# Patient Record
Sex: FEMALE | Race: ASIAN | NOT HISPANIC OR LATINO | URBAN - METROPOLITAN AREA
[De-identification: names, ages, dates, MRNs, and addresses within clinical notes are randomized per-mention and may not be internally consistent; named-entity substitution may affect disease eponyms.]

---

## 2021-02-14 ENCOUNTER — INPATIENT (INPATIENT)
Facility: HOSPITAL | Age: 36
LOS: 4 days | Discharge: HOME | End: 2021-02-19
Attending: SPECIALIST | Admitting: SPECIALIST
Payer: COMMERCIAL

## 2021-02-14 DIAGNOSIS — O26.899 OTHER SPECIFIED PREGNANCY RELATED CONDITIONS, UNSPECIFIED TRIMESTER: ICD-10-CM

## 2021-02-14 DIAGNOSIS — Z3A.00 WEEKS OF GESTATION OF PREGNANCY NOT SPECIFIED: ICD-10-CM

## 2021-02-14 LAB
APTT BLD: 28.1 SEC — SIGNIFICANT CHANGE UP (ref 27.5–35.5)
FIBRINOGEN PPP-MCNC: 370 MG/DL — SIGNIFICANT CHANGE UP (ref 258–438)
HCT VFR BLD CALC: 42.5 % — SIGNIFICANT CHANGE UP (ref 34.5–45)
HGB BLD-MCNC: 13.9 G/DL — SIGNIFICANT CHANGE UP (ref 11.5–15.5)
INR BLD: 0.98 — SIGNIFICANT CHANGE UP (ref 0.88–1.16)
MCHC RBC-ENTMCNC: 30 PG — SIGNIFICANT CHANGE UP (ref 27–34)
MCHC RBC-ENTMCNC: 32.7 GM/DL — SIGNIFICANT CHANGE UP (ref 32–36)
MCV RBC AUTO: 91.8 FL — SIGNIFICANT CHANGE UP (ref 80–100)
NRBC # BLD: 0 /100 WBCS — SIGNIFICANT CHANGE UP (ref 0–0)
PLATELET # BLD AUTO: 215 K/UL — SIGNIFICANT CHANGE UP (ref 150–400)
PROTHROM AB SERPL-ACNC: 11.8 SEC — SIGNIFICANT CHANGE UP (ref 10.6–13.6)
RBC # BLD: 4.63 M/UL — SIGNIFICANT CHANGE UP (ref 3.8–5.2)
RBC # FLD: 14 % — SIGNIFICANT CHANGE UP (ref 10.3–14.5)
WBC # BLD: 7.91 K/UL — SIGNIFICANT CHANGE UP (ref 3.8–10.5)
WBC # FLD AUTO: 7.91 K/UL — SIGNIFICANT CHANGE UP (ref 3.8–10.5)

## 2021-02-14 RX ORDER — OXYMETAZOLINE HYDROCHLORIDE 0.5 MG/ML
5 SPRAY NASAL ONCE
Refills: 0 | Status: COMPLETED | OUTPATIENT
Start: 2021-02-14 | End: 2021-02-14

## 2021-02-15 VITALS — HEIGHT: 63 IN | WEIGHT: 169.76 LBS

## 2021-02-15 LAB
ALBUMIN SERPL ELPH-MCNC: 3.4 G/DL — SIGNIFICANT CHANGE UP (ref 3.3–5)
ALP SERPL-CCNC: 116 U/L — SIGNIFICANT CHANGE UP (ref 40–120)
ALT FLD-CCNC: 10 U/L — SIGNIFICANT CHANGE UP (ref 10–45)
ANION GAP SERPL CALC-SCNC: 11 MMOL/L — SIGNIFICANT CHANGE UP (ref 5–17)
ANISOCYTOSIS BLD QL: SLIGHT — SIGNIFICANT CHANGE UP
APTT BLD: 28.6 SEC — SIGNIFICANT CHANGE UP (ref 27.5–35.5)
AST SERPL-CCNC: 20 U/L — SIGNIFICANT CHANGE UP (ref 10–40)
BASOPHILS # BLD AUTO: 0 K/UL — SIGNIFICANT CHANGE UP (ref 0–0.2)
BASOPHILS NFR BLD AUTO: 0 % — SIGNIFICANT CHANGE UP (ref 0–2)
BILIRUB SERPL-MCNC: 0.5 MG/DL — SIGNIFICANT CHANGE UP (ref 0.2–1.2)
BLD GP AB SCN SERPL QL: NEGATIVE — SIGNIFICANT CHANGE UP
BUN SERPL-MCNC: 17 MG/DL — SIGNIFICANT CHANGE UP (ref 7–23)
CALCIUM SERPL-MCNC: 8.7 MG/DL — SIGNIFICANT CHANGE UP (ref 8.4–10.5)
CHLORIDE SERPL-SCNC: 107 MMOL/L — SIGNIFICANT CHANGE UP (ref 96–108)
CO2 SERPL-SCNC: 22 MMOL/L — SIGNIFICANT CHANGE UP (ref 22–31)
CREAT SERPL-MCNC: 0.54 MG/DL — SIGNIFICANT CHANGE UP (ref 0.5–1.3)
EOSINOPHIL # BLD AUTO: 0 K/UL — SIGNIFICANT CHANGE UP (ref 0–0.5)
EOSINOPHIL NFR BLD AUTO: 0 % — SIGNIFICANT CHANGE UP (ref 0–6)
FIBRINOGEN PPP-MCNC: 335 MG/DL — SIGNIFICANT CHANGE UP (ref 258–438)
GLUCOSE SERPL-MCNC: 79 MG/DL — SIGNIFICANT CHANGE UP (ref 70–99)
HCT VFR BLD CALC: 37.5 % — SIGNIFICANT CHANGE UP (ref 34.5–45)
HGB BLD-MCNC: 12.4 G/DL — SIGNIFICANT CHANGE UP (ref 11.5–15.5)
INR BLD: 0.98 — SIGNIFICANT CHANGE UP (ref 0.88–1.16)
LYMPHOCYTES # BLD AUTO: 0.93 K/UL — LOW (ref 1–3.3)
LYMPHOCYTES # BLD AUTO: 9.6 % — LOW (ref 13–44)
MACROCYTES BLD QL: SLIGHT — SIGNIFICANT CHANGE UP
MANUAL SMEAR VERIFICATION: SIGNIFICANT CHANGE UP
MCHC RBC-ENTMCNC: 30.9 PG — SIGNIFICANT CHANGE UP (ref 27–34)
MCHC RBC-ENTMCNC: 33.1 GM/DL — SIGNIFICANT CHANGE UP (ref 32–36)
MCV RBC AUTO: 93.5 FL — SIGNIFICANT CHANGE UP (ref 80–100)
METAMYELOCYTES # FLD: 0.9 % — HIGH (ref 0–0)
MICROCYTES BLD QL: SLIGHT — SIGNIFICANT CHANGE UP
MONOCYTES # BLD AUTO: 0.93 K/UL — HIGH (ref 0–0.9)
MONOCYTES NFR BLD AUTO: 9.6 % — SIGNIFICANT CHANGE UP (ref 2–14)
NEUTROPHILS # BLD AUTO: 7.66 K/UL — HIGH (ref 1.8–7.4)
NEUTROPHILS NFR BLD AUTO: 77.2 % — HIGH (ref 43–77)
NEUTS BAND # BLD: 1.8 % — SIGNIFICANT CHANGE UP (ref 0–8)
NRBC # BLD: 1 /100 — HIGH (ref 0–0)
NRBC # BLD: SIGNIFICANT CHANGE UP /100 WBCS (ref 0–0)
PLAT MORPH BLD: NORMAL — SIGNIFICANT CHANGE UP
PLATELET # BLD AUTO: 208 K/UL — SIGNIFICANT CHANGE UP (ref 150–400)
POLYCHROMASIA BLD QL SMEAR: SLIGHT — SIGNIFICANT CHANGE UP
POTASSIUM SERPL-MCNC: 4 MMOL/L — SIGNIFICANT CHANGE UP (ref 3.5–5.3)
POTASSIUM SERPL-SCNC: 4 MMOL/L — SIGNIFICANT CHANGE UP (ref 3.5–5.3)
PROT SERPL-MCNC: 6.1 G/DL — SIGNIFICANT CHANGE UP (ref 6–8.3)
PROTHROM AB SERPL-ACNC: 11.8 SEC — SIGNIFICANT CHANGE UP (ref 10.6–13.6)
RBC # BLD: 4.01 M/UL — SIGNIFICANT CHANGE UP (ref 3.8–5.2)
RBC # FLD: 13.9 % — SIGNIFICANT CHANGE UP (ref 10.3–14.5)
RBC BLD AUTO: ABNORMAL
RH IG SCN BLD-IMP: POSITIVE — SIGNIFICANT CHANGE UP
RH IG SCN BLD-IMP: POSITIVE — SIGNIFICANT CHANGE UP
SARS-COV-2 IGG SERPL QL IA: NEGATIVE — SIGNIFICANT CHANGE UP
SARS-COV-2 IGM SERPL IA-ACNC: 0.08 INDEX — SIGNIFICANT CHANGE UP
SARS-COV-2 RNA SPEC QL NAA+PROBE: SIGNIFICANT CHANGE UP
SODIUM SERPL-SCNC: 140 MMOL/L — SIGNIFICANT CHANGE UP (ref 135–145)
SPHEROCYTES BLD QL SMEAR: SLIGHT — SIGNIFICANT CHANGE UP
T PALLIDUM AB TITR SER: NEGATIVE — SIGNIFICANT CHANGE UP
VARIANT LYMPHS # BLD: 0.9 % — SIGNIFICANT CHANGE UP (ref 0–6)
WBC # BLD: 9.69 K/UL — SIGNIFICANT CHANGE UP (ref 3.8–10.5)
WBC # FLD AUTO: 9.69 K/UL — SIGNIFICANT CHANGE UP (ref 3.8–10.5)

## 2021-02-15 PROCEDURE — 88307 TISSUE EXAM BY PATHOLOGIST: CPT | Mod: 26

## 2021-02-15 RX ORDER — MAGNESIUM HYDROXIDE 400 MG/1
30 TABLET, CHEWABLE ORAL
Refills: 0 | Status: DISCONTINUED | OUTPATIENT
Start: 2021-02-15 | End: 2021-02-19

## 2021-02-15 RX ORDER — OXYCODONE HYDROCHLORIDE 5 MG/1
5 TABLET ORAL ONCE
Refills: 0 | Status: DISCONTINUED | OUTPATIENT
Start: 2021-02-15 | End: 2021-02-19

## 2021-02-15 RX ORDER — SODIUM CHLORIDE 9 MG/ML
1000 INJECTION, SOLUTION INTRAVENOUS
Refills: 0 | Status: DISCONTINUED | OUTPATIENT
Start: 2021-02-15 | End: 2021-02-19

## 2021-02-15 RX ORDER — KETOROLAC TROMETHAMINE 30 MG/ML
30 SYRINGE (ML) INJECTION EVERY 6 HOURS
Refills: 0 | Status: DISCONTINUED | OUTPATIENT
Start: 2021-02-15 | End: 2021-02-16

## 2021-02-15 RX ORDER — ACETAMINOPHEN 500 MG
975 TABLET ORAL EVERY 6 HOURS
Refills: 0 | Status: DISCONTINUED | OUTPATIENT
Start: 2021-02-15 | End: 2021-02-19

## 2021-02-15 RX ORDER — TETANUS TOXOID, REDUCED DIPHTHERIA TOXOID AND ACELLULAR PERTUSSIS VACCINE, ADSORBED 5; 2.5; 8; 8; 2.5 [IU]/.5ML; [IU]/.5ML; UG/.5ML; UG/.5ML; UG/.5ML
0.5 SUSPENSION INTRAMUSCULAR ONCE
Refills: 0 | Status: DISCONTINUED | OUTPATIENT
Start: 2021-02-15 | End: 2021-02-19

## 2021-02-15 RX ORDER — SIMETHICONE 80 MG/1
80 TABLET, CHEWABLE ORAL EVERY 4 HOURS
Refills: 0 | Status: DISCONTINUED | OUTPATIENT
Start: 2021-02-15 | End: 2021-02-19

## 2021-02-15 RX ORDER — ONDANSETRON 8 MG/1
4 TABLET, FILM COATED ORAL EVERY 6 HOURS
Refills: 0 | Status: DISCONTINUED | OUTPATIENT
Start: 2021-02-15 | End: 2021-02-15

## 2021-02-15 RX ORDER — FOLIC ACID 0.8 MG
1 TABLET ORAL DAILY
Refills: 0 | Status: DISCONTINUED | OUTPATIENT
Start: 2021-02-15 | End: 2021-02-19

## 2021-02-15 RX ORDER — LANOLIN
1 OINTMENT (GRAM) TOPICAL EVERY 6 HOURS
Refills: 0 | Status: DISCONTINUED | OUTPATIENT
Start: 2021-02-15 | End: 2021-02-19

## 2021-02-15 RX ORDER — NALOXONE HYDROCHLORIDE 4 MG/.1ML
0.1 SPRAY NASAL
Refills: 0 | Status: DISCONTINUED | OUTPATIENT
Start: 2021-02-15 | End: 2021-02-15

## 2021-02-15 RX ORDER — DIPHENHYDRAMINE HCL 50 MG
25 CAPSULE ORAL EVERY 6 HOURS
Refills: 0 | Status: DISCONTINUED | OUTPATIENT
Start: 2021-02-15 | End: 2021-02-19

## 2021-02-15 RX ORDER — IBUPROFEN 200 MG
600 TABLET ORAL EVERY 6 HOURS
Refills: 0 | Status: COMPLETED | OUTPATIENT
Start: 2021-02-15 | End: 2022-01-14

## 2021-02-15 RX ORDER — SODIUM CHLORIDE 9 MG/ML
1000 INJECTION, SOLUTION INTRAVENOUS
Refills: 0 | Status: DISCONTINUED | OUTPATIENT
Start: 2021-02-15 | End: 2021-02-15

## 2021-02-15 RX ORDER — DEXAMETHASONE 0.5 MG/5ML
4 ELIXIR ORAL EVERY 6 HOURS
Refills: 0 | Status: DISCONTINUED | OUTPATIENT
Start: 2021-02-15 | End: 2021-02-15

## 2021-02-15 RX ORDER — CITRIC ACID/SODIUM CITRATE 300-500 MG
30 SOLUTION, ORAL ORAL ONCE
Refills: 0 | Status: COMPLETED | OUTPATIENT
Start: 2021-02-15 | End: 2021-02-15

## 2021-02-15 RX ORDER — FAMOTIDINE 10 MG/ML
20 INJECTION INTRAVENOUS ONCE
Refills: 0 | Status: COMPLETED | OUTPATIENT
Start: 2021-02-15 | End: 2021-02-15

## 2021-02-15 RX ORDER — OXYTOCIN 10 UNIT/ML
333.33 VIAL (ML) INJECTION
Qty: 20 | Refills: 0 | Status: DISCONTINUED | OUTPATIENT
Start: 2021-02-15 | End: 2021-02-19

## 2021-02-15 RX ORDER — MORPHINE SULFATE 50 MG/1
0.2 CAPSULE, EXTENDED RELEASE ORAL ONCE
Refills: 0 | Status: DISCONTINUED | OUTPATIENT
Start: 2021-02-15 | End: 2021-02-15

## 2021-02-15 RX ORDER — METOCLOPRAMIDE HCL 10 MG
10 TABLET ORAL ONCE
Refills: 0 | Status: COMPLETED | OUTPATIENT
Start: 2021-02-15 | End: 2021-02-15

## 2021-02-15 RX ORDER — CEFAZOLIN SODIUM 1 G
2000 VIAL (EA) INJECTION ONCE
Refills: 0 | Status: COMPLETED | OUTPATIENT
Start: 2021-02-15 | End: 2021-02-15

## 2021-02-15 RX ORDER — OXYTOCIN 10 UNIT/ML
333.33 VIAL (ML) INJECTION
Qty: 20 | Refills: 0 | Status: DISCONTINUED | OUTPATIENT
Start: 2021-02-15 | End: 2021-02-15

## 2021-02-15 RX ORDER — SODIUM CHLORIDE 9 MG/ML
1000 INJECTION, SOLUTION INTRAVENOUS ONCE
Refills: 0 | Status: COMPLETED | OUTPATIENT
Start: 2021-02-15 | End: 2021-02-15

## 2021-02-15 RX ORDER — METOCLOPRAMIDE HCL 10 MG
10 TABLET ORAL ONCE
Refills: 0 | Status: DISCONTINUED | OUTPATIENT
Start: 2021-02-15 | End: 2021-02-15

## 2021-02-15 RX ORDER — FERROUS SULFATE 325(65) MG
325 TABLET ORAL DAILY
Refills: 0 | Status: DISCONTINUED | OUTPATIENT
Start: 2021-02-15 | End: 2021-02-19

## 2021-02-15 RX ORDER — OXYCODONE HYDROCHLORIDE 5 MG/1
5 TABLET ORAL
Refills: 0 | Status: COMPLETED | OUTPATIENT
Start: 2021-02-15 | End: 2021-02-22

## 2021-02-15 RX ADMIN — Medication 10 MILLIGRAM(S): at 18:03

## 2021-02-15 RX ADMIN — SODIUM CHLORIDE 2000 MILLILITER(S): 9 INJECTION, SOLUTION INTRAVENOUS at 09:09

## 2021-02-15 RX ADMIN — Medication 1 APPLICATION(S): at 00:45

## 2021-02-15 RX ADMIN — Medication 30 MILLILITER(S): at 09:09

## 2021-02-15 RX ADMIN — SODIUM CHLORIDE 1000 MILLILITER(S): 9 INJECTION, SOLUTION INTRAVENOUS at 01:00

## 2021-02-15 RX ADMIN — SODIUM CHLORIDE 125 MILLILITER(S): 9 INJECTION, SOLUTION INTRAVENOUS at 09:09

## 2021-02-15 RX ADMIN — OXYMETAZOLINE HYDROCHLORIDE 5 SPRAY(S): 0.5 SPRAY NASAL at 00:50

## 2021-02-15 RX ADMIN — Medication 30 MILLIGRAM(S): at 14:17

## 2021-02-15 RX ADMIN — Medication 100 MILLIGRAM(S): at 09:09

## 2021-02-15 RX ADMIN — Medication 30 MILLIGRAM(S): at 19:37

## 2021-02-15 RX ADMIN — FAMOTIDINE 20 MILLIGRAM(S): 10 INJECTION INTRAVENOUS at 09:09

## 2021-02-15 NOTE — CONSULT NOTE ADULT - SUBJECTIVE AND OBJECTIVE BOX
HPI: 35y Female with no PMH  at 33 weeks presenting with 1 day of epistaxis. Patient states that this morning she had an episode of bleeding and presented to the ED and was discharged after bleeding resolved. This evening bleeding has continued for approximately 2 hours of pressure without stopping. Patient seen at the bedside and actively bleeding anteriorly for the right naris and coughing up clots. Denies previous episodes of epistaxis, easy bruising, hemarthrosis. Endorses heavy periods. No known hematologic conditions in her family. Patient not on any anticoagulation or herbal supplements. Hgb 13, platelets 213, and coags wnl.     After cauterization of anterior septum with silver nitrate, patient started feeling lightheaded, systolic blood pressure in the 90s, baseline 150s. 1L bolus started. Tocometer showing decelerations and patient repositioned. Patient subsequently reported improvement of symptoms and decelerations resolved.    Allergies    No Known Allergies    Intolerances        All other standing medications:   oxymetazoline 0.05% Nasal Spray 5 Spray(s) Both Nostrils Once  silver nitrate Applicator 1 Application(s) Topical Once      LABS:  CBC-                        13.9   7.91  )-----------( 215      ( 2021 23:16 )             42.5               Coagulation Studies-  PT/INR - ( 2021 23:16 )   PT: 11.8 sec;   INR: 0.98          PTT - ( 2021 23:16 )  PTT:28.1 sec  Endocrine Panel-        PHYSICAL EXAM:    ENT EXAM-     General: sitting down upright, coughing up clots of blood, blood oozing from right naris, patient not acutely distressed and holding paper towel to nose with head flexed  Head: atraumatic and normocephalic  Oral: moist mucous membranes, tongue midline, posterior oropharynx clear of blood  Nose: right anterior septum with 2 areas of active oozing, cauterized with silver nitrate; bleeding resolved, fibrillar absorbable packing placed in right nasal cavity  Neck: soft, flat, full ROM  Resp: normal work of breathing      RADIOLOGY & ADDITIONAL STUDIES:      Assessment/Plan:  35y Female  at 33weeks, presenting with epistaxis, found to have oozing from anterior septum, cauterized with silver nitrate and absorbable fibrillar placed, hemostasis achieved.    -Absorbable packing will stay in place and no need for removal  -Afrin BID for 3 days the PRN if bleeding restarts  -Obstetrics care per primary team  -Please page ENT with questions or concerns      Thank you for the consult, please page ENT at [phone] with any questions/concerns.  -------------------------------------------------  Yuliana Elizabeth MD  Department of Otolaryngology - Head and Neck Surgery  [facility]

## 2021-02-15 NOTE — CONSULT NOTE ADULT - SUBJECTIVE AND OBJECTIVE BOX
HPI: 35y Female with no PMH  at 33 weeks returning with epistaxis, after discharge (please see previous consult note for initial presentation). After patient was discharged, epistaxis resumed in parking garage and patient returned to hospital. States she is bleeding bilaterally but amount is significantly decreased from prior encounter. Packing removed and blood suctioned. Nasal endoscopy performed showing bleeding in posterior nasopharynx without clear source. Vitals signs stable. Contractions detected. Fetal tocometer showing evidence of decelerations. Repeat labs within normal limits.    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 Plans to deliver at [facility].     Allergies    No Known Allergies    Intolerances  LABS:  CBC-                        12.4   9.69  )-----------( 208      ( 15 Feb 2021 07:00 )             37.5       Coagulation Studies-  PT/INR - ( 15 Feb 2021 07:00 )   PT: 11.8 sec;   INR: 0.98          PTT - ( 15 Feb 2021 07:00 )  PTT:28.6 sec  Endocrine Panel-        PHYSICAL EXAM:    General: sitting down upright, blood oozing from right naris with slight trickle on left naris, patient not acutely distressed and holding paper towel to nose with head flexed  Head: atraumatic and normocephalic  Oral: moist mucous membranes, tongue midline, posterior oropharynx with streak of blood  Nose: clear anteriorly  Neck: soft, flat, full ROM  Resp: normal work of breathing          Nasal Endoscopy Findings:  -nasal passages clear after blood suctioned, areas of streaked blood on posterior nasopharynx, no clear source visualized    Procedure:  afrin and lidocaine with epi soaked pledgets placed in bilateral nostrils for 5 minutes, nasal cavity suctioned and absorbable fibrillar and floseal placed in the right nasal cavity.       Assessment/Plan:  35y Female  at 33weeks, presenting with epistaxis, found to have oozing from anterior septum and posterior nasopharynx, likely AVM of pregnancy causing refractory epistaxis.     -Recommend prolonged monitoring or admission due to continued bleeding and obstetric concerns  -Patient with moustache gauze dressing; please monitor how often having to change, if >every 1 hour, please contact ENT   -Absorbable packing will stay in place and no need for removal, but ENT will re-assess for bleeding and consider absorbable 8cm merocels if epistaxis continues  -Afrin BID for 3 days the PRN if bleeding restarts  -Obstetrics care per primary team  -Please have patient follow up with Dr. Luz Thomas, call [phone] for appointment  -Please page ENT with questions or concerns        Thank you for the consult, please page ENT at [phone] with any questions/concerns.  -------------------------------------------------  Yuliana Elizabeth MD  Department of Otolaryngology - Head and Neck Surgery  [facility]

## 2021-02-15 NOTE — CONSULT NOTE ADULT - SUBJECTIVE AND OBJECTIVE BOX
See previous notes for full hx.    Now s/p c - section. Re evaluated. No longer bleeding from the nose. Packing in place.    PE:  4x4 gauze in place, no blood (has been there for several hours)  R side  anterior nasal packing in place, not saturated  L side without any bleeding, only dry crusting at the naris   Opx: no active area of bleeding    A/P: 34yo F 36 weeks pregnant p/w epistaxis overnight, now s/p c section delivery and packing of the nasal cavity.   - some mild bleeding is still expected 2/2 clot mobilization from sinuses and nasal cavity  - 1-2 gauzes of 4x4 mustache dressing is expected to be saturated for first 24 hours after packing   - no nose blowing, no straining with bowel movements and no heavy lifting for at least 2 weeks  - packing is absorbable and will slowly mobilize over the next 2 weeks (will look like thick mucous secretions)  - saline rinses to the nose can start bid in 3 days to humidify the nose   - if she oozes again, can try copious afrin spraying and holding the soft part of the nose for 10 minutes    d/w Dr. Thomas  See previous notes for full hx.    Now s/p c - section. Re evaluated. No longer bleeding from the nose. Packing in place.    PE:  4x4 gauze in place, no blood (has been there for several hours)  R side  anterior nasal packing in place, not saturated  L side without any bleeding, only dry crusting at the naris   Opx: no active area of bleeding    NB: Patient was COVID swabbed by me, transorally and retroflexed to the Npx to avoid further trauma to the nose.     A/P: 34yo F 36 weeks pregnant p/w epistaxis overnight, now s/p c section delivery and packing of the nasal cavity.   - some mild bleeding is still expected 2/2 clot mobilization from sinuses and nasal cavity  - 1-2 gauzes of 4x4 mustache dressing is expected to be saturated for first 24 hours after packing   - no nose blowing, no straining with bowel movements and no heavy lifting for at least 2 weeks  - packing is absorbable and will slowly mobilize over the next 2 weeks (will look like thick mucous secretions)  - saline rinses to the nose can start bid in 3 days (ie Thursday 2/18) to humidify the nose   - if she oozes again, can try copious afrin spraying and holding the soft part of the nose for 10 minutes  - can follow up with Dr. Thomas on discharge, 1-2 weeks following     d/w Dr. Thomas

## 2021-02-16 LAB
BASOPHILS # BLD AUTO: 0.03 K/UL — SIGNIFICANT CHANGE UP (ref 0–0.2)
BASOPHILS NFR BLD AUTO: 0.3 % — SIGNIFICANT CHANGE UP (ref 0–2)
EOSINOPHIL # BLD AUTO: 0.03 K/UL — SIGNIFICANT CHANGE UP (ref 0–0.5)
EOSINOPHIL NFR BLD AUTO: 0.3 % — SIGNIFICANT CHANGE UP (ref 0–6)
GRAM STN FLD: SIGNIFICANT CHANGE UP
GRAM STN FLD: SIGNIFICANT CHANGE UP
HCT VFR BLD CALC: 28.4 % — LOW (ref 34.5–45)
HGB BLD-MCNC: 9.1 G/DL — LOW (ref 11.5–15.5)
IMM GRANULOCYTES NFR BLD AUTO: 1.1 % — SIGNIFICANT CHANGE UP (ref 0–1.5)
LYMPHOCYTES # BLD AUTO: 1.42 K/UL — SIGNIFICANT CHANGE UP (ref 1–3.3)
LYMPHOCYTES # BLD AUTO: 12.5 % — LOW (ref 13–44)
MCHC RBC-ENTMCNC: 30.2 PG — SIGNIFICANT CHANGE UP (ref 27–34)
MCHC RBC-ENTMCNC: 32 GM/DL — SIGNIFICANT CHANGE UP (ref 32–36)
MCV RBC AUTO: 94.4 FL — SIGNIFICANT CHANGE UP (ref 80–100)
MONOCYTES # BLD AUTO: 1.01 K/UL — HIGH (ref 0–0.9)
MONOCYTES NFR BLD AUTO: 8.9 % — SIGNIFICANT CHANGE UP (ref 2–14)
NEUTROPHILS # BLD AUTO: 8.75 K/UL — HIGH (ref 1.8–7.4)
NEUTROPHILS NFR BLD AUTO: 76.9 % — SIGNIFICANT CHANGE UP (ref 43–77)
NRBC # BLD: 0 /100 WBCS — SIGNIFICANT CHANGE UP (ref 0–0)
PLATELET # BLD AUTO: 179 K/UL — SIGNIFICANT CHANGE UP (ref 150–400)
RBC # BLD: 3.01 M/UL — LOW (ref 3.8–5.2)
RBC # FLD: 14.3 % — SIGNIFICANT CHANGE UP (ref 10.3–14.5)
SPECIMEN SOURCE: SIGNIFICANT CHANGE UP
SPECIMEN SOURCE: SIGNIFICANT CHANGE UP
WBC # BLD: 11.37 K/UL — HIGH (ref 3.8–10.5)
WBC # FLD AUTO: 11.37 K/UL — HIGH (ref 3.8–10.5)

## 2021-02-16 RX ORDER — LANOLIN ALCOHOL/MO/W.PET/CERES
3 CREAM (GRAM) TOPICAL AT BEDTIME
Refills: 0 | Status: DISCONTINUED | OUTPATIENT
Start: 2021-02-16 | End: 2021-02-19

## 2021-02-16 RX ORDER — IBUPROFEN 200 MG
600 TABLET ORAL EVERY 6 HOURS
Refills: 0 | Status: DISCONTINUED | OUTPATIENT
Start: 2021-02-16 | End: 2021-02-19

## 2021-02-16 RX ADMIN — Medication 30 MILLIGRAM(S): at 00:32

## 2021-02-16 RX ADMIN — Medication 600 MILLIGRAM(S): at 11:05

## 2021-02-16 RX ADMIN — Medication 325 MILLIGRAM(S): at 11:05

## 2021-02-16 RX ADMIN — Medication 1 MILLIGRAM(S): at 11:05

## 2021-02-16 RX ADMIN — Medication 30 MILLIGRAM(S): at 06:00

## 2021-02-16 RX ADMIN — Medication 975 MILLIGRAM(S): at 22:38

## 2021-02-16 RX ADMIN — Medication 1 TABLET(S): at 11:05

## 2021-02-16 RX ADMIN — Medication 3 MILLIGRAM(S): at 22:39

## 2021-02-16 RX ADMIN — Medication 975 MILLIGRAM(S): at 08:16

## 2021-02-16 RX ADMIN — Medication 600 MILLIGRAM(S): at 17:16

## 2021-02-16 RX ADMIN — Medication 975 MILLIGRAM(S): at 14:36

## 2021-02-16 NOTE — LACTATION INITIAL EVALUATION - LACTATION INTERVENTIONS
Mom has a breast pump to use at home/initiate skin to skin/initiate hand expression routine/initiate dual electric pump routine/initiate/review early breastfeeding management guidelines/initiate/review supplementation plan due to medical indications/review techniques to increase milk supply

## 2021-02-17 LAB — SURGICAL PATHOLOGY STUDY: SIGNIFICANT CHANGE UP

## 2021-02-17 RX ORDER — OXYCODONE HYDROCHLORIDE 5 MG/1
5 TABLET ORAL ONCE
Refills: 0 | Status: DISCONTINUED | OUTPATIENT
Start: 2021-02-17 | End: 2021-02-17

## 2021-02-17 RX ORDER — OXYCODONE HYDROCHLORIDE 5 MG/1
5 TABLET ORAL
Refills: 0 | Status: DISCONTINUED | OUTPATIENT
Start: 2021-02-17 | End: 2021-02-19

## 2021-02-17 RX ADMIN — OXYCODONE HYDROCHLORIDE 5 MILLIGRAM(S): 5 TABLET ORAL at 09:08

## 2021-02-17 RX ADMIN — Medication 600 MILLIGRAM(S): at 12:27

## 2021-02-17 RX ADMIN — Medication 600 MILLIGRAM(S): at 18:42

## 2021-02-17 RX ADMIN — Medication 975 MILLIGRAM(S): at 07:41

## 2021-02-17 RX ADMIN — Medication 1 TABLET(S): at 12:27

## 2021-02-17 RX ADMIN — Medication 3 MILLIGRAM(S): at 22:54

## 2021-02-17 RX ADMIN — Medication 1 MILLIGRAM(S): at 12:27

## 2021-02-17 RX ADMIN — Medication 975 MILLIGRAM(S): at 15:36

## 2021-02-17 RX ADMIN — Medication 975 MILLIGRAM(S): at 21:01

## 2021-02-17 RX ADMIN — Medication 325 MILLIGRAM(S): at 12:27

## 2021-02-17 RX ADMIN — Medication 600 MILLIGRAM(S): at 05:55

## 2021-02-18 LAB
-  CEFAZOLIN: SIGNIFICANT CHANGE UP
-  CLINDAMYCIN: SIGNIFICANT CHANGE UP
-  ERYTHROMYCIN: SIGNIFICANT CHANGE UP
-  LINEZOLID: SIGNIFICANT CHANGE UP
-  OXACILLIN: SIGNIFICANT CHANGE UP
-  RIFAMPIN: SIGNIFICANT CHANGE UP
-  TRIMETHOPRIM/SULFAMETHOXAZOLE: SIGNIFICANT CHANGE UP
-  VANCOMYCIN: SIGNIFICANT CHANGE UP
METHOD TYPE: SIGNIFICANT CHANGE UP

## 2021-02-18 RX ORDER — ACETAMINOPHEN 500 MG
3 TABLET ORAL
Qty: 0 | Refills: 0 | DISCHARGE
Start: 2021-02-18

## 2021-02-18 RX ORDER — IBUPROFEN 200 MG
1 TABLET ORAL
Qty: 0 | Refills: 0 | DISCHARGE
Start: 2021-02-18

## 2021-02-18 RX ADMIN — Medication 325 MILLIGRAM(S): at 11:47

## 2021-02-18 RX ADMIN — Medication 975 MILLIGRAM(S): at 21:00

## 2021-02-18 RX ADMIN — Medication 975 MILLIGRAM(S): at 02:27

## 2021-02-18 RX ADMIN — Medication 600 MILLIGRAM(S): at 00:01

## 2021-02-18 RX ADMIN — Medication 600 MILLIGRAM(S): at 06:03

## 2021-02-18 RX ADMIN — Medication 975 MILLIGRAM(S): at 14:41

## 2021-02-18 RX ADMIN — OXYCODONE HYDROCHLORIDE 5 MILLIGRAM(S): 5 TABLET ORAL at 06:03

## 2021-02-18 RX ADMIN — Medication 1 TABLET(S): at 11:47

## 2021-02-18 RX ADMIN — OXYCODONE HYDROCHLORIDE 5 MILLIGRAM(S): 5 TABLET ORAL at 14:42

## 2021-02-18 RX ADMIN — Medication 600 MILLIGRAM(S): at 18:29

## 2021-02-18 RX ADMIN — Medication 600 MILLIGRAM(S): at 11:47

## 2021-02-18 RX ADMIN — Medication 1 MILLIGRAM(S): at 11:48

## 2021-02-18 NOTE — DISCHARGE NOTE OB - MEDICATION SUMMARY - MEDICATIONS TO TAKE
I will START or STAY ON the medications listed below when I get home from the hospital:    acetaminophen 325 mg oral tablet  -- 3 tab(s) by mouth every 6 hours  -- Indication: For Pain    ibuprofen 600 mg oral tablet  -- 1 tab(s) by mouth every 6 hours  -- Indication: For Pin    Prenatal Multivitamins with Folic Acid 1 mg oral tablet  -- 1 tab(s) by mouth once a day  -- Indication: For Postpartum

## 2021-02-18 NOTE — DISCHARGE NOTE OB - PATIENT PORTAL LINK FT
You can access the FollowMyHealth Patient Portal offered by [facility] by registering at the following website: [email or web address]. By joining [facility]’s FollowMyHealth portal, you will also be able to view your health information using other applications (apps) compatible with our system.

## 2021-02-18 NOTE — DISCHARGE NOTE OB - HOSPITAL COURSE
Patient had uncomplicated low transverse  section.  Please see operative note for details.  During postpartum course patient's vitals were stable, vaginal bleeding appropriate, and pain well controlled.  Post operation day one hematocrit was appropriate.  On day of discharge patient was ambulating, her pain controlled with oral medications, had adequate oral intake, and was voiding freely.  Discharge instructions and precautions were given.  Will return to clinic in 2 weeks for incision check.

## 2021-02-18 NOTE — DISCHARGE NOTE OB - CARE PROVIDER_API CALL
Flori Hebert)  Obstetrics and Gynecology  [address], [address] [place] [address]  Phone: [phone]  Fax: [phone]  Follow Up Time:

## 2021-02-19 VITALS
SYSTOLIC BLOOD PRESSURE: 127 MMHG | TEMPERATURE: 98 F | OXYGEN SATURATION: 100 % | RESPIRATION RATE: 18 BRPM | HEART RATE: 78 BPM | DIASTOLIC BLOOD PRESSURE: 80 MMHG

## 2021-02-19 PROCEDURE — 86850 RBC ANTIBODY SCREEN: CPT

## 2021-02-19 PROCEDURE — 86901 BLOOD TYPING SEROLOGIC RH(D): CPT

## 2021-02-19 PROCEDURE — 87186 SC STD MICRODIL/AGAR DIL: CPT

## 2021-02-19 PROCEDURE — 80053 COMPREHEN METABOLIC PANEL: CPT

## 2021-02-19 PROCEDURE — 85610 PROTHROMBIN TIME: CPT

## 2021-02-19 PROCEDURE — 87070 CULTURE OTHR SPECIMN AEROBIC: CPT

## 2021-02-19 PROCEDURE — 36415 COLL VENOUS BLD VENIPUNCTURE: CPT

## 2021-02-19 PROCEDURE — U0003: CPT

## 2021-02-19 PROCEDURE — 85027 COMPLETE CBC AUTOMATED: CPT

## 2021-02-19 PROCEDURE — 59050 FETAL MONITOR W/REPORT: CPT

## 2021-02-19 PROCEDURE — 85384 FIBRINOGEN ACTIVITY: CPT

## 2021-02-19 PROCEDURE — 88307 TISSUE EXAM BY PATHOLOGIST: CPT

## 2021-02-19 PROCEDURE — 85025 COMPLETE CBC W/AUTO DIFF WBC: CPT

## 2021-02-19 PROCEDURE — 85730 THROMBOPLASTIN TIME PARTIAL: CPT

## 2021-02-19 PROCEDURE — 99214 OFFICE O/P EST MOD 30 MIN: CPT

## 2021-02-19 PROCEDURE — 86769 SARS-COV-2 COVID-19 ANTIBODY: CPT

## 2021-02-19 PROCEDURE — 86780 TREPONEMA PALLIDUM: CPT

## 2021-02-19 PROCEDURE — 86900 BLOOD TYPING SEROLOGIC ABO: CPT

## 2021-02-19 PROCEDURE — U0005: CPT

## 2021-02-19 RX ADMIN — OXYCODONE HYDROCHLORIDE 5 MILLIGRAM(S): 5 TABLET ORAL at 06:02

## 2021-02-19 RX ADMIN — Medication 600 MILLIGRAM(S): at 00:06

## 2021-02-19 RX ADMIN — Medication 1 MILLIGRAM(S): at 12:00

## 2021-02-19 RX ADMIN — Medication 975 MILLIGRAM(S): at 15:27

## 2021-02-19 RX ADMIN — Medication 3 MILLIGRAM(S): at 00:06

## 2021-02-19 RX ADMIN — Medication 975 MILLIGRAM(S): at 02:51

## 2021-02-19 RX ADMIN — Medication 600 MILLIGRAM(S): at 12:00

## 2021-02-19 RX ADMIN — Medication 600 MILLIGRAM(S): at 06:02

## 2021-02-19 RX ADMIN — Medication 600 MILLIGRAM(S): at 17:39

## 2021-02-19 RX ADMIN — Medication 1 TABLET(S): at 12:00

## 2021-02-19 RX ADMIN — Medication 975 MILLIGRAM(S): at 09:05

## 2021-02-19 NOTE — PROGRESS NOTE ADULT - ASSESSMENT
35y Female POD#4 s/p C/S, c/b epistaxis  1. epistaxis: No additional episodes of bleeding noted.  1. Neuro/Pain:  toradol atc, tylenol atc, oxy prn  2  CV:  VS per routine  3. Pulm: Encourage ISS & Ambulation  4. GI:  Advance as terese  5. : voiding  6. DVT ppx: SCDs   7. Dispo: POD #4
35y Female POD#2    s/p C/S for NRFHT remote from delivery, Uncomplicated                                       - Neuro/Pain:  toradol atc, tylenol atc, oxy prn  - CV:  VS per routine  - Pulm: Encourage ISS & Ambulation  - GI: Advance as tolerated  - : Voiding spontaneously  - DVT ppx: SCDs, Lovenox 40mg QD  - Dispo: POD #2/3
35y Female POD#3 s/p C/S, c/b epistaxis  1. epistaxis: No additional episodes of bleeding noted.  1. Neuro/Pain:  toradol atc, tylenol atc, oxy prn  2  CV:  VS per routine  3. Pulm: Encourage ISS & Ambulation  4. GI:  Advance as terese  5. : voiding  6. DVT ppx: SCDs   7. Dispo: POD #3
35y Female POD#2 s/p C/S, c/b epistaxis  1. epistaxis: ENT following. No additional episodes of bleeding noted.  1. Neuro/Pain:  toradol atc, tylenol atc, oxy prn  2  CV:  VS per routine  3. Pulm: Encourage ISS & Ambulation  4. GI:  Advance as terese  5. : voiding  6. DVT ppx: SCDs   7. Dispo: POD #2 or #3  
35y Female POD#1  s/p C/S, c/b epistaxis  1. epistaxis: ENT following. No additional episodes of bleeding noted.  1. Neuro/Pain:  toradol atc, tylenol atc, oxy prn  2  CV:  VS per routine, AM CBC pending  3. Pulm: Encourage ISS & Ambulation  4. GI:  Advance as terese  5. : Le in place, to be removed this morning, TOV this afternoon  6. DVT ppx: SCDs   7. Dispo: POD #2 or #3

## 2021-02-19 NOTE — PROGRESS NOTE ADULT - SUBJECTIVE AND OBJECTIVE BOX
Patient evaluated at bedside.   She reports pain is well controlled with OPM.  She has been ambulating without assistance, voiding spontaneously, passing gas, tolerating regular diet.  She denies HA, vision changes, dizziness, CP, palpitations, SOB, n/v, ruq/epigastric pain, heavy vaginal bleeding, or additional epistaxis    Physical Exam:  Vital Signs Last 24 Hrs  T(C): 36.8 (17 Feb 2021 06:00), Max: 37.3 (16 Feb 2021 22:45)  T(F): 98.3 (17 Feb 2021 06:00), Max: 99.1 (16 Feb 2021 22:45)  HR: 92 (17 Feb 2021 06:00) (92 - 106)  BP: 104/62 (17 Feb 2021 06:00) (104/62 - 123/86)  RR: 18 (17 Feb 2021 06:00) (18 - 20)  SpO2: 99% (17 Feb 2021 06:00) (98% - 100%)    Gen: NAD  HEENT: nasal packing in place, no bleeding noted  Abd: + BS, soft, nontender, nondistended, no rebound or guarding  Incision clean, dry and intact  uterus firm at midline below the umbilicus  : lochia WNL  Extremities: no swelling or calf tenderness                          9.1    11.37 )-----------( 179      ( 16 Feb 2021 08:44 )             28.4     02-15    140  |  107  |  17  ----------------------------<  79  4.0   |  22  |  0.54    Ca    8.7      15 Feb 2021 09:08    TPro  6.1  /  Alb  3.4  /  TBili  0.5  /  DBili  x   /  AST  20  /  ALT  10  /  AlkPhos  116  02-15        
Patient evaluated at bedside.   She reports pain is well controlled. Le in place. Passing flatus.  Not OOB yet.  She denies HA, dizziness, CP, palpitations, SOB, n/v, heavy vaginal bleeding, or episodes of epistaxis    Physical Exam:  Vital Signs Last 24 Hrs  T(C): 37.2 (16 Feb 2021 02:00), Max: 37.2 (16 Feb 2021 02:00)  T(F): 99 (16 Feb 2021 02:00), Max: 99 (16 Feb 2021 02:00)  HR: 96 (16 Feb 2021 02:00) (86 - 114)  BP: 106/67 (16 Feb 2021 02:00) (97/57 - 129/71)  BP(mean): 80 (16 Feb 2021 02:00) (80 - 80)  RR: 18 (16 Feb 2021 02:00) (17 - 19)  SpO2: 98% (16 Feb 2021 02:00) (97% - 100%)    Gen: NAD  HEENT: nasal packing in place, no bleeding noted  Abd: + BS, soft, nontender, nondistended, no rebound or guarding  Incision clean, dry and intact, compression dressing removed  uterus firm at midline below the umbilcius  : lochia WNL  Extremities: no swelling or calf tenderness                          12.4   9.69  )-----------( 208      ( 15 Feb 2021 07:00 )             37.5     02-15    140  |  107  |  17  ----------------------------<  79  4.0   |  22  |  0.54    Ca    8.7      15 Feb 2021 09:08    TPro  6.1  /  Alb  3.4  /  TBili  0.5  /  DBili  x   /  AST  20  /  ALT  10  /  AlkPhos  116  02-15      PT/INR - ( 15 Feb 2021 07:00 )   PT: 11.8 sec;   INR: 0.98          PTT - ( 15 Feb 2021 07:00 )  PTT:28.6 sec  
Patient evaluated at bedside.   She reports pain is well controlled with OPM.  She has been ambulating without assistance, voiding spontaneously, passing gas, tolerating regular diet and is breastfeeding.  She denies HA, dizziness, CP, palpitations, SOB, n/v, or heavy vaginal bleeding or additional episodes of epistaxis.    Physical Exam:  Vital Signs Last 24 Hrs  T(C): 36.6 (18 Feb 2021 02:27), Max: 36.8 (17 Feb 2021 09:23)  T(F): 97.8 (18 Feb 2021 02:27), Max: 98.2 (17 Feb 2021 09:23)  HR: 86 (18 Feb 2021 02:27) (86 - 100)  BP: 117/74 (18 Feb 2021 02:27) (104/70 - 125/85)    RR: 18 (18 Feb 2021 02:27) (18 - 18)  SpO2: 99% (18 Feb 2021 02:27) (98% - 100%)    Gen: NAD  HEENT: nasal packing in place, no bleeding noted  Abd: + BS, soft, nontender, nondistended, no rebound or guarding  Incision clean, dry and intact  uterus firm at midline below the umbilicus  : lochia WNL  Extremities: no swelling or calf tenderness                
Patient evaluated at bedside.   She reports pain is well controlled with OPM.  She has been ambulating without assistance, voiding spontaneously, passing gas, tolerating regular diet and is breastfeeding.  She denies HA, dizziness, CP, palpitations, SOB, n/v, or heavy vaginal bleeding.    Physical Exam:  Vital Signs Last 24 Hrs  T(C): 36.8 (17 Feb 2021 06:00), Max: 37.3 (16 Feb 2021 22:45)  T(F): 98.3 (17 Feb 2021 06:00), Max: 99.1 (16 Feb 2021 22:45)  HR: 92 (17 Feb 2021 06:00) (92 - 106)  BP: 104/62 (17 Feb 2021 06:00) (104/62 - 123/86)  BP(mean): --  RR: 18 (17 Feb 2021 06:00) (18 - 20)  SpO2: 99% (17 Feb 2021 06:00) (98% - 100%)    Gen: NAD  Abd: + BS, soft, nontender, nondistended, no rebound or guarding  Incision clean, dry and intact  uterus firm at midline  : lochia WNL  Extremities: no swelling or calf tenderness                          9.1    11.37 )-----------( 179      ( 16 Feb 2021 08:44 )             28.4     02-15    140  |  107  |  17  ----------------------------<  79  4.0   |  22  |  0.54    Ca    8.7      15 Feb 2021 09:08    TPro  6.1  /  Alb  3.4  /  TBili  0.5  /  DBili  x   /  AST  20  /  ALT  10  /  AlkPhos  116  02-15      PT/INR - ( 15 Feb 2021 07:00 )   PT: 11.8 sec;   INR: 0.98          PTT - ( 15 Feb 2021 07:00 )  PTT:28.6 sec  
Patient evaluated at bedside.   She reports pain is well controlled with OPM.  She has been ambulating without assistance, voiding spontaneously, passing gas, tolerating regular diet.  She denies HA, dizziness, CP, palpitations, SOB, n/v, or heavy vaginal bleeding. No additional episodes of epistaxis ON.    Physical Exam:  Vital Signs Last 24 Hrs  T(C): 36.6 (19 Feb 2021 02:10), Max: 36.8 (18 Feb 2021 14:00)  T(F): 97.8 (19 Feb 2021 02:10), Max: 98.2 (18 Feb 2021 14:00)  HR: 71 (19 Feb 2021 02:10) (71 - 92)  BP: 103/65 (19 Feb 2021 02:10) (103/65 - 124/77)  RR: 18 (19 Feb 2021 02:10) (18 - 18)  SpO2: 98% (19 Feb 2021 02:10) (96% - 100%)    Gen: NAD  HEENT: nasal packing appears dissolved, no bleeding noted.  Abd: + BS, soft, nontender, nondistended, no rebound or guarding  Incision clean, dry and intact  uterus firm at midline below the umbilicus  : lochia WNL  Extremities: no swelling or calf tenderness

## 2021-02-20 LAB
CULTURE RESULTS: NO GROWTH — SIGNIFICANT CHANGE UP
SPECIMEN SOURCE: SIGNIFICANT CHANGE UP

## 2021-03-02 DIAGNOSIS — Q27.39 ARTERIOVENOUS MALFORMATION, OTHER SITE: ICD-10-CM

## 2021-03-02 DIAGNOSIS — Z3A.36 36 WEEKS GESTATION OF PREGNANCY: ICD-10-CM

## 2021-03-02 DIAGNOSIS — R04.0 EPISTAXIS: ICD-10-CM

## 2021-03-16 LAB
CULTURE RESULTS: SIGNIFICANT CHANGE UP
ORGANISM # SPEC MICROSCOPIC CNT: SIGNIFICANT CHANGE UP
ORGANISM # SPEC MICROSCOPIC CNT: SIGNIFICANT CHANGE UP
SPECIMEN SOURCE: SIGNIFICANT CHANGE UP

## 2025-03-14 NOTE — PATIENT PROFILE OB - PRO HBSAG INFANT
Patient prefers not to be on treatment at this point the DEXA scan has been ordered through her endocrinologist, I did  the patient on the concerns of osteoporosis risk of fracture and treatment options recommend weightbearing exercises, calcium and vitamin D encouraged to consider Prolia at this point time she does not want treatment      negative

## 2025-07-14 NOTE — PATIENT PROFILE OB - PRO BLOOD TYPE INFANT
N plate given as charted, tolerated well. Discharged in satisfactory condition. Ambulated off unit per self  
O positive